# Patient Record
Sex: MALE | Race: BLACK OR AFRICAN AMERICAN | Employment: UNEMPLOYED | URBAN - METROPOLITAN AREA
[De-identification: names, ages, dates, MRNs, and addresses within clinical notes are randomized per-mention and may not be internally consistent; named-entity substitution may affect disease eponyms.]

---

## 2022-07-20 ENCOUNTER — HOSPITAL ENCOUNTER (OUTPATIENT)
Age: 42
Setting detail: OBSERVATION
Discharge: HOME | End: 2022-07-22
Attending: EMERGENCY MEDICINE | Admitting: FAMILY MEDICINE
Payer: MEDICAID

## 2022-07-20 ENCOUNTER — APPOINTMENT (OUTPATIENT)
Dept: CT IMAGING | Age: 42
End: 2022-07-20
Payer: MEDICAID

## 2022-07-20 ENCOUNTER — APPOINTMENT (OUTPATIENT)
Dept: GENERAL RADIOLOGY | Age: 42
End: 2022-07-20
Payer: MEDICAID

## 2022-07-20 DIAGNOSIS — R55 SYNCOPE AND COLLAPSE: Primary | ICD-10-CM

## 2022-07-20 PROCEDURE — 70450 CT HEAD/BRAIN W/O DYE: CPT

## 2022-07-20 PROCEDURE — 80053 COMPREHEN METABOLIC PANEL: CPT

## 2022-07-20 PROCEDURE — 85025 COMPLETE CBC W/AUTO DIFF WBC: CPT

## 2022-07-20 PROCEDURE — 81001 URINALYSIS AUTO W/SCOPE: CPT

## 2022-07-20 PROCEDURE — 83605 ASSAY OF LACTIC ACID: CPT

## 2022-07-20 PROCEDURE — 93005 ELECTROCARDIOGRAM TRACING: CPT | Performed by: EMERGENCY MEDICINE

## 2022-07-20 PROCEDURE — 84484 ASSAY OF TROPONIN QUANT: CPT

## 2022-07-20 PROCEDURE — 83690 ASSAY OF LIPASE: CPT

## 2022-07-20 PROCEDURE — 2580000003 HC RX 258: Performed by: EMERGENCY MEDICINE

## 2022-07-20 PROCEDURE — 99285 EMERGENCY DEPT VISIT HI MDM: CPT

## 2022-07-20 RX ORDER — 0.9 % SODIUM CHLORIDE 0.9 %
1000 INTRAVENOUS SOLUTION INTRAVENOUS ONCE
Status: COMPLETED | OUTPATIENT
Start: 2022-07-20 | End: 2022-07-21

## 2022-07-20 RX ADMIN — SODIUM CHLORIDE 1000 ML: 9 INJECTION, SOLUTION INTRAVENOUS at 23:41

## 2022-07-20 ASSESSMENT — PAIN - FUNCTIONAL ASSESSMENT: PAIN_FUNCTIONAL_ASSESSMENT: NONE - DENIES PAIN

## 2022-07-21 ENCOUNTER — APPOINTMENT (OUTPATIENT)
Dept: NEUROLOGY | Age: 42
End: 2022-07-21
Payer: MEDICAID

## 2022-07-21 ENCOUNTER — APPOINTMENT (OUTPATIENT)
Dept: GENERAL RADIOLOGY | Age: 42
End: 2022-07-21
Payer: MEDICAID

## 2022-07-21 PROBLEM — R55 SYNCOPE AND COLLAPSE: Status: ACTIVE | Noted: 2022-07-21

## 2022-07-21 LAB
ALBUMIN SERPL-MCNC: 3.8 G/DL (ref 3.5–5.2)
ALBUMIN SERPL-MCNC: 4.6 G/DL (ref 3.5–5.2)
ALP BLD-CCNC: 103 U/L (ref 40–129)
ALP BLD-CCNC: 121 U/L (ref 40–129)
ALT SERPL-CCNC: 47 U/L (ref 0–40)
ALT SERPL-CCNC: 59 U/L (ref 0–40)
ANION GAP SERPL CALCULATED.3IONS-SCNC: 12 MMOL/L (ref 7–16)
ANION GAP SERPL CALCULATED.3IONS-SCNC: 16 MMOL/L (ref 7–16)
AST SERPL-CCNC: 53 U/L (ref 0–39)
AST SERPL-CCNC: 77 U/L (ref 0–39)
BACTERIA: ABNORMAL /HPF
BASOPHILS ABSOLUTE: 0.04 E9/L (ref 0–0.2)
BASOPHILS ABSOLUTE: 0.04 E9/L (ref 0–0.2)
BASOPHILS RELATIVE PERCENT: 0.5 % (ref 0–2)
BASOPHILS RELATIVE PERCENT: 0.7 % (ref 0–2)
BILIRUB SERPL-MCNC: 0.7 MG/DL (ref 0–1.2)
BILIRUB SERPL-MCNC: 0.8 MG/DL (ref 0–1.2)
BILIRUBIN URINE: NEGATIVE
BLOOD, URINE: NEGATIVE
BUN BLDV-MCNC: 8 MG/DL (ref 6–20)
BUN BLDV-MCNC: 9 MG/DL (ref 6–20)
CALCIUM SERPL-MCNC: 8.3 MG/DL (ref 8.6–10.2)
CALCIUM SERPL-MCNC: 9.1 MG/DL (ref 8.6–10.2)
CHLORIDE BLD-SCNC: 105 MMOL/L (ref 98–107)
CHLORIDE BLD-SCNC: 99 MMOL/L (ref 98–107)
CLARITY: CLEAR
CO2: 20 MMOL/L (ref 22–29)
CO2: 21 MMOL/L (ref 22–29)
COLOR: YELLOW
CREAT SERPL-MCNC: 0.7 MG/DL (ref 0.7–1.2)
CREAT SERPL-MCNC: 0.8 MG/DL (ref 0.7–1.2)
EKG ATRIAL RATE: 79 BPM
EKG P AXIS: 80 DEGREES
EKG P-R INTERVAL: 148 MS
EKG Q-T INTERVAL: 410 MS
EKG QRS DURATION: 96 MS
EKG QTC CALCULATION (BAZETT): 470 MS
EKG R AXIS: 74 DEGREES
EKG T AXIS: 58 DEGREES
EKG VENTRICULAR RATE: 79 BPM
EOSINOPHILS ABSOLUTE: 0.03 E9/L (ref 0.05–0.5)
EOSINOPHILS ABSOLUTE: 0.04 E9/L (ref 0.05–0.5)
EOSINOPHILS RELATIVE PERCENT: 0.4 % (ref 0–6)
EOSINOPHILS RELATIVE PERCENT: 0.7 % (ref 0–6)
GFR AFRICAN AMERICAN: >60
GFR AFRICAN AMERICAN: >60
GFR NON-AFRICAN AMERICAN: >60 ML/MIN/1.73
GFR NON-AFRICAN AMERICAN: >60 ML/MIN/1.73
GLUCOSE BLD-MCNC: 84 MG/DL (ref 74–99)
GLUCOSE BLD-MCNC: 88 MG/DL (ref 74–99)
GLUCOSE URINE: NEGATIVE MG/DL
HCT VFR BLD CALC: 33.9 % (ref 37–54)
HCT VFR BLD CALC: 38.8 % (ref 37–54)
HEMOGLOBIN: 11.1 G/DL (ref 12.5–16.5)
HEMOGLOBIN: 12.8 G/DL (ref 12.5–16.5)
IMMATURE GRANULOCYTES #: 0.02 E9/L
IMMATURE GRANULOCYTES #: 0.04 E9/L
IMMATURE GRANULOCYTES %: 0.3 % (ref 0–5)
IMMATURE GRANULOCYTES %: 0.5 % (ref 0–5)
KETONES, URINE: 15 MG/DL
LACTIC ACID: 0.9 MMOL/L (ref 0.5–2.2)
LACTIC ACID: 1.5 MMOL/L (ref 0.5–2.2)
LACTIC ACID: 3.3 MMOL/L (ref 0.5–2.2)
LEUKOCYTE ESTERASE, URINE: NEGATIVE
LIPASE: 34 U/L (ref 13–60)
LV EF: 63 %
LVEF MODALITY: NORMAL
LYMPHOCYTES ABSOLUTE: 1.19 E9/L (ref 1.5–4)
LYMPHOCYTES ABSOLUTE: 1.27 E9/L (ref 1.5–4)
LYMPHOCYTES RELATIVE PERCENT: 16.4 % (ref 20–42)
LYMPHOCYTES RELATIVE PERCENT: 19.7 % (ref 20–42)
MCH RBC QN AUTO: 31.6 PG (ref 26–35)
MCH RBC QN AUTO: 31.8 PG (ref 26–35)
MCHC RBC AUTO-ENTMCNC: 32.7 % (ref 32–34.5)
MCHC RBC AUTO-ENTMCNC: 33 % (ref 32–34.5)
MCV RBC AUTO: 95.8 FL (ref 80–99.9)
MCV RBC AUTO: 97.1 FL (ref 80–99.9)
MONOCYTES ABSOLUTE: 0.53 E9/L (ref 0.1–0.95)
MONOCYTES ABSOLUTE: 0.63 E9/L (ref 0.1–0.95)
MONOCYTES RELATIVE PERCENT: 8.1 % (ref 2–12)
MONOCYTES RELATIVE PERCENT: 8.8 % (ref 2–12)
MUCUS: PRESENT /LPF
NEUTROPHILS ABSOLUTE: 4.22 E9/L (ref 1.8–7.3)
NEUTROPHILS ABSOLUTE: 5.74 E9/L (ref 1.8–7.3)
NEUTROPHILS RELATIVE PERCENT: 69.8 % (ref 43–80)
NEUTROPHILS RELATIVE PERCENT: 74.1 % (ref 43–80)
NITRITE, URINE: NEGATIVE
PDW BLD-RTO: 14.7 FL (ref 11.5–15)
PDW BLD-RTO: 14.8 FL (ref 11.5–15)
PH UA: 6 (ref 5–9)
PLATELET # BLD: 271 E9/L (ref 130–450)
PLATELET # BLD: 287 E9/L (ref 130–450)
PMV BLD AUTO: 10 FL (ref 7–12)
PMV BLD AUTO: 9.6 FL (ref 7–12)
POTASSIUM REFLEX MAGNESIUM: 3.7 MMOL/L (ref 3.5–5)
POTASSIUM SERPL-SCNC: 4.3 MMOL/L (ref 3.5–5)
PROTEIN UA: 100 MG/DL
RBC # BLD: 3.49 E12/L (ref 3.8–5.8)
RBC # BLD: 4.05 E12/L (ref 3.8–5.8)
RBC UA: ABNORMAL /HPF (ref 0–2)
SODIUM BLD-SCNC: 135 MMOL/L (ref 132–146)
SODIUM BLD-SCNC: 138 MMOL/L (ref 132–146)
SPECIFIC GRAVITY UA: >=1.03 (ref 1–1.03)
TOTAL PROTEIN: 6.7 G/DL (ref 6.4–8.3)
TOTAL PROTEIN: 8.4 G/DL (ref 6.4–8.3)
TROPONIN, HIGH SENSITIVITY: 8 NG/L (ref 0–11)
UROBILINOGEN, URINE: 1 E.U./DL
WBC # BLD: 6 E9/L (ref 4.5–11.5)
WBC # BLD: 7.8 E9/L (ref 4.5–11.5)
WBC UA: ABNORMAL /HPF (ref 0–5)

## 2022-07-21 PROCEDURE — 96365 THER/PROPH/DIAG IV INF INIT: CPT

## 2022-07-21 PROCEDURE — G0378 HOSPITAL OBSERVATION PER HR: HCPCS

## 2022-07-21 PROCEDURE — 96376 TX/PRO/DX INJ SAME DRUG ADON: CPT

## 2022-07-21 PROCEDURE — 2580000003 HC RX 258: Performed by: FAMILY MEDICINE

## 2022-07-21 PROCEDURE — 6370000000 HC RX 637 (ALT 250 FOR IP): Performed by: EMERGENCY MEDICINE

## 2022-07-21 PROCEDURE — 95819 EEG AWAKE AND ASLEEP: CPT | Performed by: PSYCHIATRY & NEUROLOGY

## 2022-07-21 PROCEDURE — 95819 EEG AWAKE AND ASLEEP: CPT

## 2022-07-21 PROCEDURE — 96372 THER/PROPH/DIAG INJ SC/IM: CPT

## 2022-07-21 PROCEDURE — 96361 HYDRATE IV INFUSION ADD-ON: CPT

## 2022-07-21 PROCEDURE — 93306 TTE W/DOPPLER COMPLETE: CPT

## 2022-07-21 PROCEDURE — 2580000003 HC RX 258: Performed by: EMERGENCY MEDICINE

## 2022-07-21 PROCEDURE — 83605 ASSAY OF LACTIC ACID: CPT

## 2022-07-21 PROCEDURE — 85025 COMPLETE CBC W/AUTO DIFF WBC: CPT

## 2022-07-21 PROCEDURE — 80053 COMPREHEN METABOLIC PANEL: CPT

## 2022-07-21 PROCEDURE — 6370000000 HC RX 637 (ALT 250 FOR IP): Performed by: FAMILY MEDICINE

## 2022-07-21 PROCEDURE — 71045 X-RAY EXAM CHEST 1 VIEW: CPT

## 2022-07-21 PROCEDURE — 6360000002 HC RX W HCPCS: Performed by: FAMILY MEDICINE

## 2022-07-21 RX ORDER — SODIUM CHLORIDE 0.9 % (FLUSH) 0.9 %
10 SYRINGE (ML) INJECTION EVERY 12 HOURS SCHEDULED
Status: DISCONTINUED | OUTPATIENT
Start: 2022-07-21 | End: 2022-07-22 | Stop reason: HOSPADM

## 2022-07-21 RX ORDER — PROMETHAZINE HYDROCHLORIDE 12.5 MG/1
12.5 TABLET ORAL EVERY 6 HOURS PRN
Status: DISCONTINUED | OUTPATIENT
Start: 2022-07-21 | End: 2022-07-22 | Stop reason: HOSPADM

## 2022-07-21 RX ORDER — ACETAMINOPHEN 650 MG/1
650 SUPPOSITORY RECTAL EVERY 6 HOURS PRN
Status: DISCONTINUED | OUTPATIENT
Start: 2022-07-21 | End: 2022-07-22 | Stop reason: HOSPADM

## 2022-07-21 RX ORDER — SODIUM CHLORIDE 0.9 % (FLUSH) 0.9 %
10 SYRINGE (ML) INJECTION PRN
Status: DISCONTINUED | OUTPATIENT
Start: 2022-07-21 | End: 2022-07-22 | Stop reason: HOSPADM

## 2022-07-21 RX ORDER — SODIUM CHLORIDE 9 MG/ML
INJECTION, SOLUTION INTRAVENOUS CONTINUOUS
Status: DISCONTINUED | OUTPATIENT
Start: 2022-07-21 | End: 2022-07-22 | Stop reason: HOSPADM

## 2022-07-21 RX ORDER — ACETAMINOPHEN 325 MG/1
650 TABLET ORAL EVERY 6 HOURS PRN
Status: DISCONTINUED | OUTPATIENT
Start: 2022-07-21 | End: 2022-07-22 | Stop reason: HOSPADM

## 2022-07-21 RX ORDER — VENLAFAXINE HYDROCHLORIDE 150 MG/1
150 TABLET, EXTENDED RELEASE ORAL 2 TIMES DAILY
COMMUNITY

## 2022-07-21 RX ORDER — LEVETIRACETAM 5 MG/ML
500 INJECTION INTRAVASCULAR EVERY 12 HOURS
Status: DISCONTINUED | OUTPATIENT
Start: 2022-07-21 | End: 2022-07-22 | Stop reason: HOSPADM

## 2022-07-21 RX ORDER — TRAZODONE HYDROCHLORIDE 150 MG/1
150 TABLET ORAL NIGHTLY
COMMUNITY

## 2022-07-21 RX ORDER — SODIUM CHLORIDE 9 MG/ML
INJECTION, SOLUTION INTRAVENOUS PRN
Status: DISCONTINUED | OUTPATIENT
Start: 2022-07-21 | End: 2022-07-22 | Stop reason: HOSPADM

## 2022-07-21 RX ORDER — ENOXAPARIN SODIUM 100 MG/ML
40 INJECTION SUBCUTANEOUS DAILY
Status: DISCONTINUED | OUTPATIENT
Start: 2022-07-21 | End: 2022-07-22 | Stop reason: HOSPADM

## 2022-07-21 RX ORDER — NICOTINE 21 MG/24HR
1 PATCH, TRANSDERMAL 24 HOURS TRANSDERMAL DAILY
Status: DISCONTINUED | OUTPATIENT
Start: 2022-07-21 | End: 2022-07-22 | Stop reason: HOSPADM

## 2022-07-21 RX ORDER — ONDANSETRON 2 MG/ML
4 INJECTION INTRAMUSCULAR; INTRAVENOUS EVERY 6 HOURS PRN
Status: DISCONTINUED | OUTPATIENT
Start: 2022-07-21 | End: 2022-07-22 | Stop reason: HOSPADM

## 2022-07-21 RX ORDER — 0.9 % SODIUM CHLORIDE 0.9 %
1000 INTRAVENOUS SOLUTION INTRAVENOUS ONCE
Status: COMPLETED | OUTPATIENT
Start: 2022-07-21 | End: 2022-07-21

## 2022-07-21 RX ORDER — QUETIAPINE FUMARATE 400 MG/1
400 TABLET, FILM COATED ORAL NIGHTLY
COMMUNITY

## 2022-07-21 RX ORDER — OXCARBAZEPINE 150 MG/1
150 TABLET, FILM COATED ORAL 2 TIMES DAILY
COMMUNITY

## 2022-07-21 RX ORDER — RISPERIDONE 4 MG/1
4 TABLET, FILM COATED ORAL NIGHTLY
COMMUNITY

## 2022-07-21 RX ORDER — ACETAMINOPHEN 325 MG/1
650 TABLET ORAL ONCE
Status: COMPLETED | OUTPATIENT
Start: 2022-07-21 | End: 2022-07-21

## 2022-07-21 RX ORDER — POLYETHYLENE GLYCOL 3350 17 G/17G
17 POWDER, FOR SOLUTION ORAL DAILY PRN
Status: DISCONTINUED | OUTPATIENT
Start: 2022-07-21 | End: 2022-07-22 | Stop reason: HOSPADM

## 2022-07-21 RX ADMIN — LEVETIRACETAM 500 MG: 5 INJECTION INTRAVENOUS at 04:27

## 2022-07-21 RX ADMIN — ACETAMINOPHEN 650 MG: 325 TABLET ORAL at 01:04

## 2022-07-21 RX ADMIN — SODIUM CHLORIDE 1000 ML: 9 INJECTION, SOLUTION INTRAVENOUS at 01:44

## 2022-07-21 RX ADMIN — SODIUM CHLORIDE: 9 INJECTION, SOLUTION INTRAVENOUS at 04:26

## 2022-07-21 RX ADMIN — ACETAMINOPHEN 650 MG: 325 TABLET ORAL at 15:21

## 2022-07-21 RX ADMIN — ENOXAPARIN SODIUM 40 MG: 100 INJECTION SUBCUTANEOUS at 09:20

## 2022-07-21 RX ADMIN — Medication 10 ML: at 13:26

## 2022-07-21 RX ADMIN — SODIUM CHLORIDE: 9 INJECTION, SOLUTION INTRAVENOUS at 15:25

## 2022-07-21 RX ADMIN — LEVETIRACETAM 500 MG: 5 INJECTION INTRAVENOUS at 15:24

## 2022-07-21 RX ADMIN — Medication 10 ML: at 20:27

## 2022-07-21 ASSESSMENT — PAIN SCALES - GENERAL
PAINLEVEL_OUTOF10: 9
PAINLEVEL_OUTOF10: 0
PAINLEVEL_OUTOF10: 8

## 2022-07-21 ASSESSMENT — PAIN DESCRIPTION - LOCATION: LOCATION: HEAD

## 2022-07-21 NOTE — PROCEDURES
EEG Report  Cuco Hester is a 38 y.o. male      Appointment Date 7/21/2022 Appointment Time 10:00 AM   Facility Location [facility] EEG Number [id number]   Type of Study EEG Floor 33     Technical Specifications  Technician [facility] of consciousness Awake and asleep   Sleep deprived? no   Hyperventilation tested? no   Photic stim tested? yes   EEG recording Standard 10-20 electrode placement    Duration of recording 25 min   EEG complete? yes       Clinical History   38 y.o. male presenting to the ED for syncopal event, beginning a short time ago. The complaint has been persistent, moderate in severity, and worsened by nothing. Patient reports that he is at work. Patient reports he was overheated he believes he passed out. Patient unsure as to whether he hit his head he reports no neck pain or back pain he does report multiple episodes of vomiting after event. Patient reports history of seizure he has been taking his seizure medications. Patient does take Keppra. He did take his evening dose. Patient reporting no bowel or bladder incontinence. He did not bite on his tongue. Patient reporting no numbness or tingling. Patient reporting no chest pain or difficulty breathing or shortness of breath. Patient reporting no leg pain or swelling.     Medications    Current Facility-Administered Medications:     sodium chloride flush 0.9 % injection 10 mL, 10 mL, IntraVENous, 2 times per day, Matias Lozano DO    sodium chloride flush 0.9 % injection 10 mL, 10 mL, IntraVENous, PRN, Matias Lozano DO    0.9 % sodium chloride infusion, , IntraVENous, PRN, Matias Lozano, DO    enoxaparin (LOVENOX) injection 40 mg, 40 mg, SubCUTAneous, Daily, Matias Lozano DO, 40 mg at 07/21/22 0920    promethazine (PHENERGAN) tablet 12.5 mg, 12.5 mg, Oral, Q6H PRN **OR** ondansetron (ZOFRAN) injection 4 mg, 4 mg, IntraVENous, Q6H PRN, Matias Lozano DO    polyethylene glycol (GLYCOLAX) packet 17 g, 17 g, Oral, Daily PRN, Tara Mitchell Julius Pen, DO    acetaminophen (TYLENOL) tablet 650 mg, 650 mg, Oral, Q6H PRN **OR** acetaminophen (TYLENOL) suppository 650 mg, 650 mg, Rectal, Q6H PRN, Rajesh Peoples DO    perflutren lipid microspheres (DEFINITY) injection 1.65 mg, 1.5 mL, IntraVENous, ONCE PRN, Rajesh Peoples DO    levETIRAcetam (KEPPRA) 500 mg/100 mL IVPB, 500 mg, IntraVENous, Q12H, Rajesh Peoples DO, Stopped at 07/21/22 0500    0.9 % sodium chloride infusion, , IntraVENous, Continuous, Rajesh Peoples DO, Last Rate: 75 mL/hr at 07/21/22 0426, New Bag at 07/21/22 0426  No current outpatient medications on file. Physician Interpretation    General EEG Report  EEG study was performed using the 10-20 electrode placement system in patient who was awake and responsive at time of study. No abnormal behavior or movements noted during the study. Activation procedures included photic stimulation and hyperventilation. Type of EEG:   Routine Inpatient EEG with video    Description   Background: Symmetric low amplitude fast background activity with a great deal of superimposed beta activity across all leads indicating likely electric/electrode artifact. No significant slowing appreciated except as related to sleep. There is no significant sharp activity, spike and wave discharges, or abnormal discharges appreciated during study. Sleep: N1 sleep    Photic stimulation: No induced changes  Hyperventilation: Not performed    General Impression  Normal awake and asleep EEG. The absence of epileptiform activity during EEG study does not exclude the possibility of seizures or epilepsy given limited duration of study and lack of epileptic type activity during study. Clinical correlation is indicated.     Elizabeth Christy MD     [facility]

## 2022-07-21 NOTE — PROGRESS NOTES
Patient admitted after midnight  Patient assessed at bedside, alert, oriented, answering questions appropriately. EEG and Echo complete, pending results  Patient confirms that he is compliant in medication regimen  Patient states he works in a [employer] and felt overheated yesterday, went outside, and then had his seizure  Urinal in patients room present with dark brown urine present. Continuous normal saline infusion order previously in place. Patient without medical complaints at time of assessment.

## 2022-07-21 NOTE — ED NOTES
Dr. Rema Flaherty at bedside     [facility], [address] [place] [address]  07/21/22 2964

## 2022-07-21 NOTE — H&P
Hospitalist History & Physical      PCP: No primary care provider on file. Date of Service: Pt seen/examined on 7/21/2022     Chief Complaint:  had concerns including Loss of Consciousness (Patient brought to the ED by Anitha Durán after being found on the ground with unknown loss of consciousness. Patient states he believes was dehydrated. Patient states a history of seizures but denies having a seizure today. Patient has actively vomited for ems 3 times. ). History Of Present Illness:    Mr. Hero Swift, a 38y.o. year old male  who  has no past medical history on file. Hero Swift is a 38 y.o. male presenting to the ED for syncopal event, beginning a short time ago. The complaint has been persistent, moderate in severity, and worsened by nothing. Patient reports that he is at work. Patient reports he was overheated he believes he passed out. Patient unsure as to whether he hit his head he reports no neck pain or back pain he does report multiple episodes of vomiting after event. Patient reports history of seizure he has been taking his seizure medications. Patient does take Keppra. He did take his evening dose. Patient reporting no bowel or bladder incontinence. He did not bite on his tongue. Patient reporting no numbness or tingling. Patient reporting no chest pain or difficulty breathing or shortness of breath. Patient reporting no leg pain or swelling. Vital signs within normal limits and stable. Laboratory studies demonstrate lactic acid 3.3, ALT 59, AST 77.  CT head unremarkable. Chest x-ray unremarkable. EKG unremarkable. No past medical history on file. No past surgical history on file. Prior to Admission medications    Not on File         Allergies:  Patient has no known allergies. Social History:    TOBACCO:   has no history on file for tobacco use. ETOH:   has no history on file for alcohol use.     Family History:    Reviewed in detail and negative for DM, CAD, Cancer, CVA. Positive as follows\"  No family history on file. REVIEW OF SYSTEMS:   Pertinent positives as noted in the HPI. All other systems reviewed and negative. PHYSICAL EXAM:  BP (!) 141/84   Pulse 82   Temp 98.1 °F (36.7 °C)   Resp 16   Ht 5' 9\" (1.753 m)   Wt 165 lb (74.8 kg)   SpO2 97%   BMI 24.37 kg/m²   General appearance: No apparent distress, appears stated age and cooperative. HEENT: Normal cephalic, atraumatic without obvious deformity. Pupils equal, round, and reactive to light. Extra ocular muscles intact. Conjunctivae/corneas clear. Neck: Supple, with full range of motion. No jugular venous distention. Trachea midline. Respiratory: CTA  Cardiovascular: RRR  Abdomen: S/NT/ND  Musculoskeletal: No clubbing, cyanosis, edema of bilateral lower extremities. Brisk capillary refill. Skin: Normal skin color. No rashes or lesions. Neurologic:  Neurovascularly intact without any focal sensory/motor deficits. Cranial nerves: II-XII intact, grossly non-focal.  Psychiatric: Alert and oriented, thought content appropriate, normal insight    Reviewed EKG and CXR personally      CBC:   Recent Labs     07/20/22  2344   WBC 7.8   RBC 4.05   HGB 12.8   HCT 38.8   MCV 95.8   RDW 14.8        BMP:   Recent Labs     07/20/22  2344      K 4.3   CL 99   CO2 20*   BUN 9   CREATININE 0.8     LFT:  Recent Labs     07/20/22  2344   PROT 8.4*   ALKPHOS 121   ALT 59*   AST 77*   BILITOT 0.7   LIPASE 34     CE:  No results for input(s): Valerie Parisian in the last 72 hours. PT/INR: No results for input(s): INR, APTT in the last 72 hours. BNP: No results for input(s): BNP in the last 72 hours.   ESR: No results found for: SEDRATE  CRP: No results found for: CRP  D Dimer: No results found for: DDIMER   Folate and B12: No results found for: [id number], No results found for: FOLATE  Lactic Acid:   Lab Results   Component Value Date    LACTA 3.3 (H) 07/20/2022     Thyroid Studies: No results found There is no effusion or pneumothorax. The cardiomediastinal silhouette is without acute process. The osseous structures are without acute process. No acute process. ASSESSMENT:  -Syncope versus seizure  -Lactic acidosis  -History of seizure disorder      PLAN:  -Admit to medicine  -Echocardiogram  -EEG  -Seizure precautions  -Keppra 500 mg twice daily  -Normal saline 75 mL/h  -Repeat lactic acid level        Diet: No diet orders on file  Code Status: No Order  Surrogate decision maker confirmed with patient:   Extended Emergency Contact Information  Primary Emergency Contact: [address]   [address] Phone: [phone]  Relation: Other    DVT Prophylaxis: []Lovenox []Heparin []PCD [] [address] []Encouraged ambulation  Disposition: []Med/Surg [] Intermediate [] ICU/CCU  Admit status: [] Observation [] Inpatient     +++++++++++++++++++++++++++++++++++++++++++++++++  Janette Olga, DO  +++++++++++++++++++++++++++++++++++++++++++++++++  NOTE: This report was transcribed using voice recognition software. Every effort was made to ensure accuracy; however, inadvertent computerized transcription errors may be present.

## 2022-07-21 NOTE — ED PROVIDER NOTES
HPI:  7/20/22, Time: 10:33 PM EDT         Robbin Lim is a 38 y.o. male presenting to the ED for syncopal event, beginning a short time ago. The complaint has been persistent, moderate in severity, and worsened by nothing. Patient reports that he is at work. Patient reports he was overheated he believes he passed out. Patient unsure as to whether he hit his head he reports no neck pain or back pain he does report multiple episodes of vomiting after event. Patient reports history of seizure he has been taking his seizure medications. Patient does take Keppra. He did take his evening dose. Patient reporting no bowel or bladder incontinence. He did not bite on his tongue. Patient reporting no numbness or tingling. Patient reporting no chest pain or difficulty breathing or shortness of breath. Patient reporting no leg pain or swelling. ROS:   Pertinent positives and negatives are stated within HPI, all other systems reviewed and are negative.  --------------------------------------------- PAST HISTORY ---------------------------------------------  Past Medical History:  has no past medical history on file. Past Surgical History:  has no past surgical history on file. Social History:      Family History: family history is not on file. The patients home medications have been reviewed. Allergies: Patient has no known allergies. ---------------------------------------------------PHYSICAL EXAM--------------------------------------    Constitutional/General: Alert and oriented x3, well appearing, non toxic in NAD  Head: Normocephalic and atraumatic  Eyes: PERRL, EOMI  Mouth: Oropharynx clear, handling secretions, no trismus  Neck: Supple, full ROM, non tender to palpation in the midline, no stridor, no crepitus, no meningeal signs  Pulmonary: Lungs clear to auscultation bilaterally, no wheezes, rales, or rhonchi. Not in respiratory distress  Cardiovascular:  Regular rate. Regular rhythm.  No murmurs, gallops, or rubs. 2+ distal pulses  Chest: no chest wall tenderness  Abdomen: Soft. Non tender. Non distended. +BS. No rebound, guarding, or rigidity. No pulsatile masses appreciated. Musculoskeletal: Moves all extremities x 4. Warm and well perfused, no clubbing, cyanosis, or edema. Capillary refill <3 seconds  Skin: warm and dry. No rashes. Neurologic: GCS 15, CN 2-12 grossly intact, no focal deficits, symmetric strength 5/5 in the upper and lower extremities bilaterally  Psych: Normal Affect    -------------------------------------------------- RESULTS -------------------------------------------------  I have personally reviewed all laboratory and imaging results for this patient. Results are listed below.      LABS:  Results for orders placed or performed during the hospital encounter of 07/20/22   CBC with Auto Differential   Result Value Ref Range    WBC 7.8 4.5 - 11.5 E9/L    RBC 4.05 3.80 - 5.80 E12/L    Hemoglobin 12.8 12.5 - 16.5 g/dL    Hematocrit 38.8 37.0 - 54.0 %    MCV 95.8 80.0 - 99.9 fL    MCH 31.6 26.0 - 35.0 pg    MCHC 33.0 32.0 - 34.5 %    RDW 14.8 11.5 - 15.0 fL    Platelets 460 725 - 891 E9/L    MPV 9.6 7.0 - 12.0 fL    Neutrophils % 74.1 43.0 - 80.0 %    Immature Granulocytes % 0.5 0.0 - 5.0 %    Lymphocytes % 16.4 (L) 20.0 - 42.0 %    Monocytes % 8.1 2.0 - 12.0 %    Eosinophils % 0.4 0.0 - 6.0 %    Basophils % 0.5 0.0 - 2.0 %    Neutrophils Absolute 5.74 1.80 - 7.30 E9/L    Immature Granulocytes # 0.04 E9/L    Lymphocytes Absolute 1.27 (L) 1.50 - 4.00 E9/L    Monocytes Absolute 0.63 0.10 - 0.95 E9/L    Eosinophils Absolute 0.03 (L) 0.05 - 0.50 E9/L    Basophils Absolute 0.04 0.00 - 0.20 E9/L   Comprehensive Metabolic Panel   Result Value Ref Range    Sodium 135 132 - 146 mmol/L    Potassium 4.3 3.5 - 5.0 mmol/L    Chloride 99 98 - 107 mmol/L    CO2 20 (L) 22 - 29 mmol/L    Anion Gap 16 7 - 16 mmol/L    Glucose 88 74 - 99 mg/dL    BUN 9 6 - 20 mg/dL    Creatinine 0.8 0.7 - 1.2 mg/dL    GFR Non-African American >60 >=60 mL/min/1.73    GFR African American >60     Calcium 9.1 8.6 - 10.2 mg/dL    Total Protein 8.4 (H) 6.4 - 8.3 g/dL    Albumin 4.6 3.5 - 5.2 g/dL    Total Bilirubin 0.7 0.0 - 1.2 mg/dL    Alkaline Phosphatase 121 40 - 129 U/L    ALT 59 (H) 0 - 40 U/L    AST 77 (H) 0 - 39 U/L   Troponin   Result Value Ref Range    Troponin, High Sensitivity 8 0 - 11 ng/L   Lipase   Result Value Ref Range    Lipase 34 13 - 60 U/L   Lactic Acid   Result Value Ref Range    Lactic Acid 3.3 (H) 0.5 - 2.2 mmol/L   EKG 12 Lead   Result Value Ref Range    Ventricular Rate 79 BPM    Atrial Rate 79 BPM    P-R Interval 148 ms    QRS Duration 96 ms    Q-T Interval 410 ms    QTc Calculation (Bazett) 470 ms    P Axis 80 degrees    R Axis 74 degrees    T Axis 58 degrees       RADIOLOGY:  Interpreted by Radiologist.  XR CHEST PORTABLE   Final Result   No acute process. CT HEAD WO CONTRAST   Final Result   No acute intracranial abnormality. EKG:  This EKG is signed and interpreted by me. Rate: 79  Rhythm: Sinus  Interpretation: no acute changes  Comparison: no previous EKG available        ------------------------- NURSING NOTES AND VITALS REVIEWED ---------------------------   The nursing notes within the ED encounter and vital signs as below have been reviewed by myself. BP (!) 141/84   Pulse 82   Temp 98.1 °F (36.7 °C)   Resp 16   Ht 5' 9\" (1.753 m)   Wt 165 lb (74.8 kg)   SpO2 97%   BMI 24.37 kg/m²   Oxygen Saturation Interpretation: Normal    The patients available past medical records and past encounters were reviewed.         ------------------------------ ED COURSE/MEDICAL DECISION MAKING----------------------  Medications   0.9 % sodium chloride bolus (1,000 mLs IntraVENous New Bag 7/21/22 0144)   0.9 % sodium chloride bolus (0 mLs IntraVENous Stopped 7/21/22 0056)   acetaminophen (TYLENOL) tablet 650 mg (650 mg Oral Given 7/21/22 0104)             Medical Decision Making:    Presenting here because of syncopal event. Patient believes he was overheated at work. Patient reporting no chest pain no difficulty breathing. He reports no seizure activity does have a history of seizure though. Patient did strike his head he has no neck pain or back pain or numbness or tingling. Patient neurologically intact on arrival.  Patient given IV fluids here. Patient also given Tylenol for his headache. Patient vital signs noted. Patient was made aware of findings and plan. Patient will be admitted for observation. Re-Evaluations:           Patient reevaluated and unchanged. Patient made aware of findings. Still reports feeling weak reporting no chest pain no difficulty breathing or abdominal pain. Patient will be admitted. Consultations:         internal medicine        Critical Care: This patient's ED course included: a personal history and physicial eaxmination    This patient has been closely monitored during their ED course. Counseling: The emergency provider has spoken with the patient and discussed todays results, in addition to providing specific details for the plan of care and counseling regarding the diagnosis and prognosis. Questions are answered at this time and they are agreeable with the plan.       --------------------------------- IMPRESSION AND DISPOSITION ---------------------------------    IMPRESSION  1. Syncope and collapse        DISPOSITION  Disposition: Admit to monitored bed  Patient condition is stable        NOTE: This report was transcribed using voice recognition software.  Every effort was made to ensure accuracy; however, inadvertent computerized transcription errors may be present          Cameron Hopkins MD  07/21/22 0157

## 2022-07-22 VITALS
SYSTOLIC BLOOD PRESSURE: 127 MMHG | HEIGHT: 69 IN | RESPIRATION RATE: 17 BRPM | DIASTOLIC BLOOD PRESSURE: 82 MMHG | WEIGHT: 165 LBS | BODY MASS INDEX: 24.44 KG/M2 | TEMPERATURE: 97.8 F | OXYGEN SATURATION: 99 % | HEART RATE: 81 BPM

## 2022-07-22 LAB
ALBUMIN SERPL-MCNC: 4.3 G/DL (ref 3.5–5.2)
ALP BLD-CCNC: 132 U/L (ref 40–129)
ALT SERPL-CCNC: 62 U/L (ref 0–40)
ANION GAP SERPL CALCULATED.3IONS-SCNC: 14 MMOL/L (ref 7–16)
AST SERPL-CCNC: 73 U/L (ref 0–39)
BILIRUB SERPL-MCNC: 0.7 MG/DL (ref 0–1.2)
BUN BLDV-MCNC: 5 MG/DL (ref 6–20)
CALCIUM SERPL-MCNC: 9.4 MG/DL (ref 8.6–10.2)
CHLORIDE BLD-SCNC: 107 MMOL/L (ref 98–107)
CO2: 21 MMOL/L (ref 22–29)
CREAT SERPL-MCNC: 0.9 MG/DL (ref 0.7–1.2)
GFR AFRICAN AMERICAN: >60
GFR NON-AFRICAN AMERICAN: >60 ML/MIN/1.73
GLUCOSE BLD-MCNC: 96 MG/DL (ref 74–99)
HCT VFR BLD CALC: 40.6 % (ref 37–54)
HEMOGLOBIN: 13.3 G/DL (ref 12.5–16.5)
MCH RBC QN AUTO: 32.4 PG (ref 26–35)
MCHC RBC AUTO-ENTMCNC: 32.8 % (ref 32–34.5)
MCV RBC AUTO: 99 FL (ref 80–99.9)
PDW BLD-RTO: 15 FL (ref 11.5–15)
PLATELET # BLD: 322 E9/L (ref 130–450)
PMV BLD AUTO: 9.8 FL (ref 7–12)
POTASSIUM SERPL-SCNC: 4.6 MMOL/L (ref 3.5–5)
RBC # BLD: 4.1 E12/L (ref 3.8–5.8)
SODIUM BLD-SCNC: 142 MMOL/L (ref 132–146)
TOTAL PROTEIN: 7.8 G/DL (ref 6.4–8.3)
WBC # BLD: 8.2 E9/L (ref 4.5–11.5)

## 2022-07-22 PROCEDURE — 6370000000 HC RX 637 (ALT 250 FOR IP)

## 2022-07-22 PROCEDURE — 96376 TX/PRO/DX INJ SAME DRUG ADON: CPT

## 2022-07-22 PROCEDURE — 36415 COLL VENOUS BLD VENIPUNCTURE: CPT

## 2022-07-22 PROCEDURE — 85027 COMPLETE CBC AUTOMATED: CPT

## 2022-07-22 PROCEDURE — 6360000002 HC RX W HCPCS: Performed by: FAMILY MEDICINE

## 2022-07-22 PROCEDURE — 96361 HYDRATE IV INFUSION ADD-ON: CPT

## 2022-07-22 PROCEDURE — 80053 COMPREHEN METABOLIC PANEL: CPT

## 2022-07-22 PROCEDURE — 2580000003 HC RX 258: Performed by: FAMILY MEDICINE

## 2022-07-22 PROCEDURE — G0378 HOSPITAL OBSERVATION PER HR: HCPCS

## 2022-07-22 RX ADMIN — LEVETIRACETAM 500 MG: 5 INJECTION INTRAVENOUS at 01:57

## 2022-07-22 RX ADMIN — SODIUM CHLORIDE 1000 ML: 9 INJECTION, SOLUTION INTRAVENOUS at 01:55

## 2022-07-22 NOTE — PROGRESS NOTES
Discharge instructions provided to patient-all questions answered with patient expressing understanding of the information provided-telemetry monitor returned to nurse's station.

## 2022-07-22 NOTE — PROGRESS NOTES
Dr Rosales Birmingham a second time via perfect serve, at patient request, regarding an immediate discharge.

## 2022-07-22 NOTE — PROGRESS NOTES
Dr Finn Ortez notified via perfect serve that patient and his family member would like to [facility] is considering signing out [place].

## 2022-07-22 NOTE — PROGRESS NOTES
Messaged Dr. Christina Rosenthal to see if patient can be discharged.  Notified Dr. Christina Rosenthal that patient wants to sign out AMA

## 2022-07-22 NOTE — DISCHARGE SUMMARY
Hospitalist Discharge Summary    Patient ID: Nitin Christiansen   Patient : 1980  Patient's PCP: No primary care provider on file. Admit Date: 2022   Admitting Physician: Sandy Zaman DO    Discharge Date:  2022   Discharge Physician: MARISSA Baltazar - CNP   Discharge Condition: Stable  Discharge Disposition: [facility] course in brief:  Nitin Christiansen is a 38 y.o. male presenting to the ED for syncopal event, beginning a short time ago. The complaint has been persistent, moderate in severity, and worsened by nothing. Patient reports that he is at work. Patient reports he was overheated he believes he passed out. Patient unsure as to whether he hit his head he reports no neck pain or back pain he does report multiple episodes of vomiting after event. Patient reports history of seizure he has been taking his seizure medications. Patient does take Keppra. He did take his evening dose. Patient reporting no bowel or bladder incontinence. He did not bite on his tongue. Patient reporting no numbness or tingling. Patient reporting no chest pain or difficulty breathing or shortness of breath. Patient reporting no leg pain or swelling. Vital signs within normal limits and stable. Laboratory studies demonstrate lactic acid 3.3, ALT 59, AST 77.  CT head unremarkable. Chest x-ray unremarkable. EKG unremarkable. Patient had Echo and EEG performed while in the hospital with EEG resulting Normal awake and asleep EEG. The absence of epileptiform activity during EEG study does not exclude the possibility of seizures or epilepsy given limited duration of study and lack of epileptic type activity during study. Clinical correlation is indicated. Echo resulting Normal left ventricle size and systolic function. Ejection fraction is visually estimated at 60-65%. No regional wall motion abnormalities seen. Normal left ventricle wall thickness. Normal diastolic function. Mildly dilated right ventricle. Normal right ventricular s function. TAPSE 29 mm. No significant valvular abnormalities. Unable to estimate PA systolic pressure. No evidence for hemodynamically significant pericardial effusion. Patient was rehydrated at 125ml/hr normal saline throughout admission to which the patient improved drastically. All diagnostic lab values improved with treatment. Patient to discharge home, to establish care with a PCP or to follow up with his pcp, and to adhere to his established medication regimen. Consults:   IP CONSULT TO INTERNAL MEDICINE    Discharge Diagnoses:  -Syncope versus seizure  -Lactic acidosis  -History of seizure disorder      Discharge Instructions / Follow up: Follow-up with PCP within 1 week of discharge. Follow-up with consultants as indicated by them. Compliance with medications as prescribed on discharge. No future appointments. The patient's condition is stable. At this time the patient is without objective evidence of an acute process requiring continuing hospitalization or inpatient management. They are stable for discharge with outpatient follow-up. I have spoken with the patient and discussed the results of the current hospitalization, in addition to providing specific details for the plan of care and counseling regarding the diagnosis and prognosis. The plan has been discussed in detail and they are aware of the specific conditions for emergent return, as well as the importance of follow-up. Their questions are answered at this time and they are agreeable with the plan for discharge to home. Continued appropriate risk factor modification of blood pressure, diabetes and serum lipids will remain essential to reducing risk of future atherosclerotic development    Activity: activity as tolerated    Physical exam:  General appearance: No apparent distress, appears stated age and cooperative.   HEENT: Conjunctivae/corneas trace pulmonic regurgitation present. No evidence of pulmonic valve stenosis. Pericardial Effusion  No evidence for hemodynamically significant pericardial effusion. Pleural Effusion  No evidence of pleural effusion. Aorta  Aortic root dimension within normal limits. Miscellaneous  The inferior vena cava diameter is normal with normal respiratory  variation. Conclusions   Summary  Normal left ventricle size and systolic function. Ejection fraction is visually estimated at 60-65%. No regional wall motion abnormalities seen. Normal left ventricle wall thickness. Normal diastolic function. Mildly dilated right ventricle. Normal right ventricular s function. TAPSE 29 mm. No significant valvular abnormalities. Unable to estimate PA systolic pressure. No evidence for hemodynamically significant pericardial effusion.    Signature   ----------------------------------------------------------------  Electronically signed by Jhonatan Joyce MD(Interpreting  physician) on 07/21/2022 05:37 PM  ----------------------------------------------------------------  M-Mode/2D Measurements & Calculations   LV Diastolic    LV Systolic Dimension: 3.2   AV Cusp Separation: 2.1 cmLA  Dimension: 5.4  cm                           Dimension: 3.1 cmAO Root  cm              LV Volume Diastolic: 837 ml  Dimension: 3 cm  LV FS:40.7 %    LV Volume Systolic: 08.1 ml  LV PW           LV EDV/LV EDV Index: 077  Diastolic: 0.9  IQ/17 UC/Y^5MG ESV/LV ESV  cm              Index: 48.3 ml/25ml/ m^2     RV Diastolic Dimension: 2.6  Septum          EF Calculated: 65.5 %        cm  Diastolic: 0.9  LV Mass Index: 95 l/min*m^2  cm  CO: 5.84 l/min                               LA volume/Index: 40.4 ml  CI: 3.07        LVOT: 2.1 cm                 /21.17ml/m^2  l/m*m^2                                      RA Area: 18.7 cm^2  LV Mass: 180.13  g                                            IVC Expiration: 1.9 cm  Doppler Measurements & Calculations MV Peak E-Wave: 0.97 AV Peak Velocity: 1.61 LVOT Peak Velocity: 1.64 m/s  m/s                  m/s                    LVOT Mean Velocity: 0.84 m/s  MV Peak A-Wave: 0.83 AV Peak Gradient:      LVOT Peak Gradient: 10.8  m/s                  10.42 mmHg             mmHgLVOT Mean Gradient: 3.9  MV E/A Ratio: 1.18   AV Mean Velocity: 0.99 mmHg  MV Peak Gradient:    m/s  4.1 mmHg             AV Mean Gradient: 4.7  MV Mean Gradient:    mmHg  1.6 mmHg             AV VTI: 31.6 cm  MV Mean Velocity:    AV Area  0.58 m/s             (Continuity):3.24 cm^2 PV Peak Velocity: 1.06 m/s  MV Deceleration                             PV Peak Gradient: 4.5 mmHg  Time: 249 msec       LVOT VTI: 29.6 cm      PV Mean Velocity: 0.81 m/s  MV P1/2t: 113.8 msec IVRT: 86.5 msec        PV Mean Gradient: 2.8 mmHg  MVA by PHT:1.93 cm^2  MV Area              Pulm. Vein D  (continuity): 2.7    Velocity:0.44 m/s  cm^2                 Pulm. Vein S Velocity:  MV E' Septal         0.63 m/s  Velocity: 0.09 m/s  MV E' Lateral  Velocity: 11 m/s  [email or web address] [id number]    CT HEAD WO CONTRAST    Result Date: 7/21/2022  EXAMINATION: CT OF THE HEAD WITHOUT CONTRAST  7/20/2022 11:50 pm TECHNIQUE: CT of the head was performed without the administration of intravenous contrast. Automated exposure control, iterative reconstruction, and/or weight based adjustment of the mA/kV was utilized to reduce the radiation dose to as low as reasonably achievable. COMPARISON: None. HISTORY: ORDERING SYSTEM PROVIDED HISTORY: Evaluate intracranial abnormality TECHNOLOGIST PROVIDED HISTORY: Has a \"code stroke\" or \"stroke alert\" been called? ->No Reason for exam:->Evaluate intracranial abnormality Decision Support Exception - unselect if not a suspected or confirmed emergency medical condition->Emergency Medical Condition (MA) What reading provider will be dictating this exam?->CRC FINDINGS: BRAIN/VENTRICLES: There is no acute intracranial hemorrhage, mass effect or midline shift. No abnormal extra-axial fluid collection. The gray-white differentiation is maintained without evidence of an acute infarct. There is no evidence of hydrocephalus. ORBITS: The visualized portion of the orbits demonstrate no acute abnormality. SINUSES: The visualized paranasal sinuses and mastoid air cells demonstrate no acute abnormality. SOFT TISSUES/SKULL:  No acute abnormality of the visualized skull or soft tissues. No acute intracranial abnormality. XR CHEST PORTABLE    Result Date: 7/21/2022  EXAMINATION: ONE XRAY VIEW OF THE CHEST 7/21/2022 12:56 am COMPARISON: None. HISTORY: ORDERING SYSTEM PROVIDED HISTORY: syncope TECHNOLOGIST PROVIDED HISTORY: Reason for exam:->syncope What reading provider will be dictating this exam?->CRC FINDINGS: The lungs are without acute focal process. There is no effusion or pneumothorax. The cardiomediastinal silhouette is without acute process. The osseous structures are without acute process. No acute process. Discharge Medications:      Medication List        CONTINUE taking these medications      LITHIUM CARBONATE PO     OXcarbazepine 150 MG tablet  Commonly known as: TRILEPTAL     QUEtiapine 400 MG tablet  Commonly known as: SEROQUEL     risperiDONE 4 MG tablet  Commonly known as: RISPERDAL     traZODone 150 MG tablet  Commonly known as: DESYREL     venlafaxine 150 MG extended release tablet              Time Spent on discharge is more than 45 minutes in the examination, evaluation, counseling and review of medications and discharge plan.    +++++++++++++++++++++++++++++++++++++++++++++++++  MARISSA Alvarez [address]  +++++++++++++++++++++++++++++++++++++++++++++++++  NOTE: This report was transcribed using voice recognition software.  Every effort was made to ensure accuracy; however, inadvertent computerized transcription errors may be present.

## 2022-07-22 NOTE — DISCHARGE INSTRUCTIONS
Establish care with PCP. Follow up with PCP within one week of discharge.   Adhere to anti seizure medication regimen   Ensure proper hydration in high temperatures

## 2022-07-22 NOTE — PROGRESS NOTES
Pt admits to smoking a pack a day cigarettes. Asked for a nicotine patch. Sent message to PINA Curran. Awaiting response.